# Patient Record
Sex: FEMALE | Race: BLACK OR AFRICAN AMERICAN | NOT HISPANIC OR LATINO | URBAN - METROPOLITAN AREA
[De-identification: names, ages, dates, MRNs, and addresses within clinical notes are randomized per-mention and may not be internally consistent; named-entity substitution may affect disease eponyms.]

---

## 2022-11-26 ENCOUNTER — HOSPITAL ENCOUNTER (EMERGENCY)
Facility: OTHER | Age: 22
Discharge: HOME | End: 2022-11-27
Attending: EMERGENCY MEDICINE

## 2022-11-26 DIAGNOSIS — R10.31 ACUTE RIGHT LOWER QUADRANT PAIN: Primary | ICD-10-CM

## 2022-11-26 DIAGNOSIS — R10.12 LEFT UPPER QUADRANT PAIN: ICD-10-CM

## 2022-11-26 LAB
ALBUMIN SERPL BCP-MCNC: 4.5 G/DL (ref 3.5–5.2)
ALP SERPL-CCNC: 38 U/L (ref 55–135)
ALT SERPL W/O P-5'-P-CCNC: 16 U/L (ref 10–44)
ANION GAP SERPL CALC-SCNC: 14 MMOL/L (ref 8–16)
AST SERPL-CCNC: 36 U/L (ref 10–40)
B-HCG UR QL: NEGATIVE
BACTERIA #/AREA URNS HPF: ABNORMAL /HPF
BASOPHILS # BLD AUTO: 0.02 K/UL (ref 0–0.2)
BASOPHILS NFR BLD: 0.3 % (ref 0–1.9)
BILIRUB SERPL-MCNC: 0.8 MG/DL (ref 0.1–1)
BILIRUB UR QL STRIP: NEGATIVE
BUN SERPL-MCNC: 15 MG/DL (ref 6–20)
CALCIUM SERPL-MCNC: 9.5 MG/DL (ref 8.7–10.5)
CHLORIDE SERPL-SCNC: 108 MMOL/L (ref 95–110)
CLARITY UR: CLEAR
CO2 SERPL-SCNC: 18 MMOL/L (ref 23–29)
COLOR UR: YELLOW
CREAT SERPL-MCNC: 0.9 MG/DL (ref 0.5–1.4)
CTP QC/QA: YES
DIFFERENTIAL METHOD: ABNORMAL
EOSINOPHIL # BLD AUTO: 0 K/UL (ref 0–0.5)
EOSINOPHIL NFR BLD: 0 % (ref 0–8)
ERYTHROCYTE [DISTWIDTH] IN BLOOD BY AUTOMATED COUNT: 13.5 % (ref 11.5–14.5)
EST. GFR  (NO RACE VARIABLE): >60 ML/MIN/1.73 M^2
GLUCOSE SERPL-MCNC: 84 MG/DL (ref 70–110)
GLUCOSE UR QL STRIP: NEGATIVE
HCT VFR BLD AUTO: 33.9 % (ref 37–48.5)
HGB BLD-MCNC: 11.3 G/DL (ref 12–16)
HGB UR QL STRIP: NEGATIVE
HYALINE CASTS #/AREA URNS LPF: 0 /LPF
IMM GRANULOCYTES # BLD AUTO: 0.02 K/UL (ref 0–0.04)
IMM GRANULOCYTES NFR BLD AUTO: 0.3 % (ref 0–0.5)
KETONES UR QL STRIP: ABNORMAL
LEUKOCYTE ESTERASE UR QL STRIP: NEGATIVE
LIPASE SERPL-CCNC: 3 U/L (ref 4–60)
LYMPHOCYTES # BLD AUTO: 1.2 K/UL (ref 1–4.8)
LYMPHOCYTES NFR BLD: 15.2 % (ref 18–48)
MCH RBC QN AUTO: 30.9 PG (ref 27–31)
MCHC RBC AUTO-ENTMCNC: 33.3 G/DL (ref 32–36)
MCV RBC AUTO: 93 FL (ref 82–98)
MICROSCOPIC COMMENT: ABNORMAL
MONOCYTES # BLD AUTO: 0.3 K/UL (ref 0.3–1)
MONOCYTES NFR BLD: 3.3 % (ref 4–15)
NEUTROPHILS # BLD AUTO: 6.5 K/UL (ref 1.8–7.7)
NEUTROPHILS NFR BLD: 80.9 % (ref 38–73)
NITRITE UR QL STRIP: NEGATIVE
NRBC BLD-RTO: 0 /100 WBC
PH UR STRIP: 6 [PH] (ref 5–8)
PLATELET # BLD AUTO: 353 K/UL (ref 150–450)
PMV BLD AUTO: 9 FL (ref 9.2–12.9)
POTASSIUM SERPL-SCNC: 4.3 MMOL/L (ref 3.5–5.1)
PROT SERPL-MCNC: 8.4 G/DL (ref 6–8.4)
PROT UR QL STRIP: ABNORMAL
RBC # BLD AUTO: 3.66 M/UL (ref 4–5.4)
RBC #/AREA URNS HPF: 0 /HPF (ref 0–4)
SODIUM SERPL-SCNC: 140 MMOL/L (ref 136–145)
SP GR UR STRIP: >=1.03 (ref 1–1.03)
SQUAMOUS #/AREA URNS HPF: 7 /HPF
URN SPEC COLLECT METH UR: ABNORMAL
UROBILINOGEN UR STRIP-ACNC: 1 EU/DL
WBC # BLD AUTO: 7.98 K/UL (ref 3.9–12.7)
WBC #/AREA URNS HPF: 5 /HPF (ref 0–5)

## 2022-11-26 PROCEDURE — 25000003 PHARM REV CODE 250: Performed by: EMERGENCY MEDICINE

## 2022-11-26 PROCEDURE — 96360 HYDRATION IV INFUSION INIT: CPT

## 2022-11-26 PROCEDURE — 96372 THER/PROPH/DIAG INJ SC/IM: CPT | Mod: 59 | Performed by: NURSE PRACTITIONER

## 2022-11-26 PROCEDURE — 25000003 PHARM REV CODE 250: Performed by: NURSE PRACTITIONER

## 2022-11-26 PROCEDURE — 99285 EMERGENCY DEPT VISIT HI MDM: CPT | Mod: 25

## 2022-11-26 PROCEDURE — 83690 ASSAY OF LIPASE: CPT | Performed by: NURSE PRACTITIONER

## 2022-11-26 PROCEDURE — 81025 URINE PREGNANCY TEST: CPT | Performed by: NURSE PRACTITIONER

## 2022-11-26 PROCEDURE — 85025 COMPLETE CBC W/AUTO DIFF WBC: CPT | Performed by: NURSE PRACTITIONER

## 2022-11-26 PROCEDURE — 80053 COMPREHEN METABOLIC PANEL: CPT | Performed by: NURSE PRACTITIONER

## 2022-11-26 PROCEDURE — 81000 URINALYSIS NONAUTO W/SCOPE: CPT | Performed by: NURSE PRACTITIONER

## 2022-11-26 PROCEDURE — 63600175 PHARM REV CODE 636 W HCPCS: Performed by: NURSE PRACTITIONER

## 2022-11-26 PROCEDURE — 96361 HYDRATE IV INFUSION ADD-ON: CPT

## 2022-11-26 RX ORDER — KETOROLAC TROMETHAMINE 10 MG/1
10 TABLET, FILM COATED ORAL EVERY 8 HOURS PRN
Qty: 15 TABLET | Refills: 0 | Status: SHIPPED | OUTPATIENT
Start: 2022-11-26 | End: 2022-12-01

## 2022-11-26 RX ORDER — HYDROCODONE BITARTRATE AND ACETAMINOPHEN 5; 325 MG/1; MG/1
1 TABLET ORAL
Status: COMPLETED | OUTPATIENT
Start: 2022-11-26 | End: 2022-11-26

## 2022-11-26 RX ORDER — KETOROLAC TROMETHAMINE 30 MG/ML
30 INJECTION, SOLUTION INTRAMUSCULAR; INTRAVENOUS
Status: COMPLETED | OUTPATIENT
Start: 2022-11-26 | End: 2022-11-26

## 2022-11-26 RX ORDER — SODIUM CHLORIDE 9 MG/ML
1000 INJECTION, SOLUTION INTRAVENOUS
Status: COMPLETED | OUTPATIENT
Start: 2022-11-26 | End: 2022-11-27

## 2022-11-26 RX ORDER — ONDANSETRON 2 MG/ML
4 INJECTION INTRAMUSCULAR; INTRAVENOUS
Status: COMPLETED | OUTPATIENT
Start: 2022-11-26 | End: 2022-11-26

## 2022-11-26 RX ORDER — ONDANSETRON 2 MG/ML
4 INJECTION INTRAMUSCULAR; INTRAVENOUS
Status: DISCONTINUED | OUTPATIENT
Start: 2022-11-26 | End: 2022-11-26

## 2022-11-26 RX ORDER — ONDANSETRON 8 MG/1
8 TABLET, ORALLY DISINTEGRATING ORAL EVERY 6 HOURS PRN
Qty: 15 TABLET | Refills: 0 | Status: SHIPPED | OUTPATIENT
Start: 2022-11-26

## 2022-11-26 RX ADMIN — SODIUM CHLORIDE 1000 ML: 0.9 INJECTION, SOLUTION INTRAVENOUS at 11:11

## 2022-11-26 RX ADMIN — KETOROLAC TROMETHAMINE 30 MG: 30 INJECTION, SOLUTION INTRAMUSCULAR; INTRAVENOUS at 09:11

## 2022-11-26 RX ADMIN — HYDROCODONE BITARTRATE AND ACETAMINOPHEN 1 TABLET: 5; 325 TABLET ORAL at 11:11

## 2022-11-26 RX ADMIN — ONDANSETRON 4 MG: 2 INJECTION INTRAMUSCULAR; INTRAVENOUS at 08:11

## 2022-11-26 RX ADMIN — SODIUM CHLORIDE 1000 ML: 0.9 INJECTION, SOLUTION INTRAVENOUS at 09:11

## 2022-11-27 VITALS
SYSTOLIC BLOOD PRESSURE: 103 MMHG | BODY MASS INDEX: 21.71 KG/M2 | HEIGHT: 61 IN | WEIGHT: 115 LBS | TEMPERATURE: 99 F | HEART RATE: 91 BPM | DIASTOLIC BLOOD PRESSURE: 58 MMHG | OXYGEN SATURATION: 100 % | RESPIRATION RATE: 16 BRPM

## 2022-11-27 NOTE — ED PROVIDER NOTES
Encounter Date: 11/26/2022    SCRIBE #1 NOTE: I, Paradise Chappell, am scribing for, and in the presence of,  Rema Pimentel MD.     History     Chief Complaint   Patient presents with    Abdominal Pain     Reports stabbing and cramping pain mid abdomen x 3 months before q period. Pt reports recent diagnosis of fibroids and cyst to right ovary.     Nausea    Vomiting     Time seen by provider: 9:25 PM    This is a 22 y.o. female who presents with complaint of abdominal pain for the past three days. She also reports some spotting when her symptoms started, but states that has since resolved. Her LMP was about two weeks ago, when she had similar symptoms and was diagnosed with a cyst in her right ovary. She also reports some nausea, vomiting and diarrhea that began today. She states she has had 15 episodes of vomiting today. Patient denies any cough, dysuria, urinary frequency, or fever. She notes that her current pain is more severe than when she was diagnosed with the ovarian cyst. Patient has been on oral birth control for the past four years. She denies any history of abdominal surgery or lio;ar GI symptoms prior to cyst diagnosis. No other exacerbating or alleviating factors. No other associated symptoms.      The history is provided by the patient.   Review of patient's allergies indicates:  No Known Allergies  History reviewed. No pertinent past medical history.  Past Surgical History:   Procedure Laterality Date    SHOULDER SURGERY Right     x 2     No family history on file.     Review of Systems   Constitutional:  Negative for fever.   HENT:  Negative for sore throat.    Respiratory:  Negative for cough and shortness of breath.    Cardiovascular:  Negative for chest pain.   Gastrointestinal:  Positive for abdominal pain, diarrhea, nausea and vomiting.   Genitourinary:  Positive for vaginal bleeding (resolved). Negative for dysuria and frequency.   Musculoskeletal:  Negative for back pain.   Skin:  Negative  for rash.   Neurological:  Negative for weakness.   Hematological:  Does not bruise/bleed easily.     Physical Exam     Initial Vitals [11/26/22 1906]   BP Pulse Resp Temp SpO2   116/72 80 18 99.2 °F (37.3 °C) 97 %      MAP       --         Physical Exam    Nursing note and vitals reviewed.  Constitutional: She appears well-developed and well-nourished. She is not diaphoretic. No distress.   HENT:   Head: Normocephalic and atraumatic.   Neck:   Normal range of motion.  Cardiovascular:  Normal rate and regular rhythm.           Pulmonary/Chest: No respiratory distress. She has no wheezes. She has no rales.   Abdominal:   Abdominal tenderness, LUQ greater than RUQ. Mild left thoracic paraspinal tenderness.   Musculoskeletal:         General: Normal range of motion.      Cervical back: Normal range of motion.     Neurological: She is alert.   Skin: Skin is warm.       ED Course   Procedures  Labs Reviewed   CBC W/ AUTO DIFFERENTIAL - Abnormal; Notable for the following components:       Result Value    RBC 3.66 (*)     Hemoglobin 11.3 (*)     Hematocrit 33.9 (*)     MPV 9.0 (*)     Gran % 80.9 (*)     Lymph % 15.2 (*)     Mono % 3.3 (*)     All other components within normal limits   COMPREHENSIVE METABOLIC PANEL - Abnormal; Notable for the following components:    CO2 18 (*)     Alkaline Phosphatase 38 (*)     All other components within normal limits   LIPASE - Abnormal; Notable for the following components:    Lipase 3 (*)     All other components within normal limits   URINALYSIS, REFLEX TO URINE CULTURE - Abnormal; Notable for the following components:    Specific Gravity, UA >=1.030 (*)     Protein, UA 1+ (*)     Ketones, UA 3+ (*)     All other components within normal limits    Narrative:     Specimen Source->Urine   URINALYSIS MICROSCOPIC - Abnormal; Notable for the following components:    Bacteria Few (*)     All other components within normal limits    Narrative:     Specimen Source->Urine   POCT URINE  PREGNANCY          Imaging Results              US Pelvis Comp with Transvag NON-OB (xpd (Final result)  Result time 11/26/22 23:15:32      Final result by Bhavna Shaffer MD (11/26/22 23:15:32)                   Impression:      No acute pelvic abnormality detected.    Dominant right ovarian follicle.      Electronically signed by: Bhavna Shaffer  Date:    11/26/2022  Time:    23:15               Narrative:    EXAMINATION:  PELVIC ULTRASOUND    CLINICAL HISTORY:  rlq pain;    TECHNIQUE:  Real-time ultrasound of the pelvis was performed transabdominally and transvaginally.    COMPARISON:  None.    FINDINGS:  The anteverted uterus measures 7.1 x 2.6 x 3.8 cm.  The endometrial stripe measures 4.1 mm.  There are no uterine masses.    The right ovary measures 3.7 x 2.3 x 2.3 cm. Vascular flow is seen in the right ovary.  A dominant right ovarian follicle is present (38/58).    The left ovary measures 2.7 x 2 x 2 cm. Vascular flow is seen in the left ovary    There is small free fluid in the pelvis.                                       Medications   sodium chloride 0.9% bolus 1,000 mL (0 mLs Intravenous Stopped 11/26/22 2315)   ondansetron injection 4 mg (4 mg Intramuscular Given 11/26/22 2059)   ketorolac injection 30 mg (30 mg Intramuscular Given 11/26/22 2103)   HYDROcodone-acetaminophen 5-325 mg per tablet 1 tablet (1 tablet Oral Given 11/26/22 2326)   0.9%  NaCl infusion (0 mLs Intravenous Stopped 11/27/22 0038)     Medical Decision Making:   Clinical Tests:   Lab Tests: Ordered and Reviewed  Radiological Study: Reviewed and Ordered  Additional MDM:   Comments: 21 y/o F c/o RLQ pain x 1 week.  She reports increase in pain and N/V today.  She was evaluated for this pain in [place] where she lives.  W/u consisted of blood work, UA, CT abd/pel and transabdominal and transvaginal US.  The pt had a copy of her test which I was able to review.  It was significant only for a right ovarian cyst and possible colitis.       On exam she had LUQ > RLQ TTP, however, she denied any LUQ pain until examined.  Triage w/u initiated by the provider in triage and s/f UA, cbc, lipase, and cmp.  Labs without significant abnormalities.  UA neg for a UTI but she did have 3+ ketones.  Pelvic US showed nml flow and a dominant right ovarian follicle.      After receiving IVFs, toradol and Norco, the patient continued to report 8/10 RLQ pain.  D/w pt repeating CT given worsening pain and new sx, but she declined.  She preferred to f/u with GI upon return home.  I did review strict precautions for seeking immediate revaluation in the ED prior to returning home.  She was given a Rx for toradol and zofran and d/c'ed in stable condition to f/u with PCP/GI for further evaluation of her pain.  .      Scribe Attestation:   Scribe #1: I performed the above scribed service and the documentation accurately describes the services I performed. I attest to the accuracy of the note.      ED Course as of 11/27/22 0657   Sat Nov 26, 2022   5435 Patient's workup so far reveals no significant abnormalities.  Pelvic ultrasound also shows the dominant right ovarian follicle that was noted previously is without any findings that would account for the patient's worsening right lower quadrant pain.  No findings concerning for areas torsion.  I did discuss with her the risks and benefits of having a repeat CT so recently after having 1.  The patient verbalizes understanding, specifically the risk of cancer with increased radiation exposure, and she would like to proceed the CT. [AA]   9007 Patient has reconsidered and she would like to defer any further imaging at this time.  She would prefer to follow-up with her GI for further evaluation.    The etiology of the patient's left upper quadrant tenderness to palpation and right lower quadrant pain is unclear at this time.  I do not suspect that it is secondary to the willing and lower ovarian follicle that was noted on CT.   I have discussed with the patient the importance of seeking immediate re-evaluation for any new or worsening symptoms.  The patient verbalized understanding and agreement.  She will be given a prescription for Toradol and Zofran. [AA]      ED Course User Index  [AA] Rema Pimentel MD          Physician Attestation for Scribe: I, Rema Pimentel  , reviewed documentation as scribed in my presence, which is both accurate and complete.       Clinical Impression:   Final diagnoses:  [R10.31] Acute right lower quadrant pain (Primary)  [R10.12] Left upper quadrant pain      ED Disposition Condition    Discharge Stable          ED Prescriptions       Medication Sig Dispense Start Date End Date Auth. Provider    ondansetron (ZOFRAN-ODT) 8 MG TbDL Take 1 tablet (8 mg total) by mouth every 6 (six) hours as needed (Nausea). 15 tablet 11/26/2022 -- Rema Pimentel MD    ketorolac (TORADOL) 10 mg tablet Take 1 tablet (10 mg total) by mouth every 8 (eight) hours as needed for Pain. 15 tablet 11/26/2022 12/1/2022 Rema Pimentel MD          Follow-up Information       Follow up With Specialties Details Why Contact Info    Primary care  Go to  If symptoms worsen before you return home     [facility] Emergency Dept Emergency Medicine   [address] [id number] [phone]             Rema Pimentel MD  11/27/22 4622       Rema Pimentel MD  11/27/22 1365

## 2022-11-27 NOTE — FIRST PROVIDER EVALUATION
"Medical screening examination initiated.  I have conducted a focused provider triage encounter, findings are as follows:    Brief history of present illness: Abdominal pain x3 days. Had similar symptoms a few weeks ago, was seen in [place] and diagnosed with ovarian cyst. Pain resolved and then returned 3 days ago. N/V starting today. No urinary symptoms. No vag discharge. No chance of pregnancy.     Vitals:    11/26/22 1906   BP: 116/72   BP Location: Left arm   Patient Position: Sitting   Pulse: 80   Resp: 18   Temp: 99.2 °F (37.3 °C)   TempSrc: Oral   SpO2: 97%   Weight: 52.2 kg (115 lb)   Height: 5' 1" (1.549 m)       Pertinent physical exam:  Appears well. No distress. Ambulating independently.     Brief workup plan:  labs, ua, upt, fluids, zofran     Preliminary workup initiated; this workup will be continued and followed by the physician or advanced practice provider that is assigned to the patient when roomed.  "